# Patient Record
Sex: MALE | Race: WHITE | Employment: FULL TIME | URBAN - METROPOLITAN AREA
[De-identification: names, ages, dates, MRNs, and addresses within clinical notes are randomized per-mention and may not be internally consistent; named-entity substitution may affect disease eponyms.]

---

## 2017-02-22 ENCOUNTER — OFFICE VISIT (OUTPATIENT)
Dept: NEUROSURGERY | Facility: CLINIC | Age: 62
End: 2017-02-22

## 2017-02-22 VITALS
DIASTOLIC BLOOD PRESSURE: 98 MMHG | SYSTOLIC BLOOD PRESSURE: 152 MMHG | BODY MASS INDEX: 19.94 KG/M2 | HEIGHT: 66 IN | HEART RATE: 74 BPM | WEIGHT: 124.1 LBS

## 2017-02-22 DIAGNOSIS — R90.0 INTRACRANIAL MASS: Primary | ICD-10-CM

## 2017-02-22 RX ORDER — CHLORAL HYDRATE 500 MG
2 CAPSULE ORAL DAILY
COMMUNITY

## 2017-02-22 RX ORDER — MULTIVIT-MIN/IRON/FOLIC ACID/K 18-600-40
CAPSULE ORAL DAILY
COMMUNITY

## 2017-02-22 ASSESSMENT — PAIN SCALES - GENERAL: PAINLEVEL: MILD PAIN (3)

## 2017-02-22 NOTE — MR AVS SNAPSHOT
"              After Visit Summary   2017    Devon Braun    MRN: [id number]           Patient Information     Date Of Birth          1955        Visit Information        Provider Department      2017 10:30 AM Sunny Tesfaye MD [facility] Neurosurgery        Today's Diagnoses     Intracranial mass    -  1       Follow-ups after your visit        Who to contact     Please call your clinic at [phone] to:    Ask questions about your health    Make or cancel appointments    Discuss your medicines    Learn about your test results    Speak to your doctor   If you have compliments or concerns about an experience at your clinic, or if you wish to file a complaint, please contact [facility] Patient Relations at [phone] or email us at [email or web address]         Additional Information About Your Visit        MyChart Information     Code42 is an electronic gateway that provides easy, online access to your medical records. With Code42, you can request a clinic appointment, read your test results, renew a prescription or communicate with your care team.     To sign up for [employer] visit the website at [email or web address]   You will be asked to enter the access code listed below, as well as some personal information. Please follow the directions to create your username and password.     Your access code is: [email or web address]  Expires: 2017  6:30 AM     Your access code will  in 90 days. If you need help or a new code, please contact your [facility] or call [phone] for assistance.        Care EveryWhere ID     This is your Care EveryWhere ID. This could be used by other organizations to access your [place] medical records  [id number]        Your Vitals Were     Pulse Height BMI (Body Mass Index)             74 1.676 m (5' 6\") 20.03 kg/m2          Blood Pressure from Last 3 Encounters:   17 (!) " 152/98   04/06/16 (!) 191/99   01/27/16 (!) 167/99    Weight from Last 3 Encounters:   02/22/17 56.3 kg (124 lb 1.6 oz)   04/06/16 55.5 kg (122 lb 4.8 oz)   01/27/16 56.7 kg (125 lb)              Today, you had the following     No orders found for display       Primary Care Provider Office Phone # Fax #    [facility] [phone]       [facility]        Thank you!     Thank you for choosing [facility]  for your care. Our goal is always to provide you with excellent care. Hearing back from our patients is one way we can continue to improve our services. Please take a few minutes to complete the written survey that you may receive in the mail after your visit with us. Thank you!             Your Updated Medication List - Protect others around you: Learn how to safely use, store and throw away your medicines at www.disposemymeds.org.          This list is accurate as of: 2/22/17 11:59 PM.  Always use your most recent med list.                   Brand Name Dispense Instructions for use    fish oil-omega-3 fatty acids 1000 MG capsule      Take 2 g by mouth daily       ibuprofen 100 MG chewable tablet    ADVIL/MOTRIN     Take 100 mg by mouth every 8 hours as needed.       latanoprost 0.005 % ophthalmic solution    XALATAN     Place 1 drop into both eyes At Bedtime       pravastatin 10 MG tablet    PRAVACHOL     Take by mouth At Bedtime       PSYLLIUM PO      Take by mouth daily       timolol 0.25 % ophthalmic solution    TIMOPTIC     Place 1 drop into both eyes 2 times daily       VITAMIN B COMPLEX PO      Take by mouth daily       Vitamin D (Cholecalciferol) 1000 UNITS Tabs      Take by mouth daily

## 2017-02-22 NOTE — NURSING NOTE
Chief Complaint   Patient presents with     RECHECK     P RETURN - MRI PRIOR Intracranial Mass     Baylee Guerrero MA

## 2017-02-22 NOTE — LETTER
2017       RE: Devon Catherine  [address]   [place] [facility] [id number]     Dear Colleague,    Thank you for referring your patient, Devon Catherine, to the [facility] NEUROSURGERY at [facility]. Please see a copy of my visit note below.    Mr. 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      We have seen him a couple of times in the past and the tumor has seemed to be stable.  It is a nonresectable tumor, although if there were focal enlargement impinging on the brainstem or nerves, it would be possible to do a subtotal therapeutic removal.  He is aware of this.      He has a number of issues affecting his life and employment now, but none of them appear to be neurologic.      He had an MRI scan which to my eye looks essentially unchanged from two years ago.  I have recommended that he have another scan and come back in 2 more years.      SUBHASH CRUZ MD       D: 2017 18:33   T: 2017 09:43   MT: CLAUDY      Name:     DEVON CATHERINE   MRN:      [id number]        Account:      [id number]   :      1955           Service Date: 2017      Document: [id number]

## 2017-02-23 NOTE — PROGRESS NOTES
Mr. 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      We have seen him a couple of times in the past and the tumor has seemed to be stable.  It is a nonresectable tumor, although if there were focal enlargement impinging on the brainstem or nerves, it would be possible to do a subtotal therapeutic removal.  He is aware of this.      He has a number of issues affecting his life and employment now, but none of them appear to be neurologic.      He had an MRI scan which to my eye looks essentially unchanged from two years ago.  I have recommended that he have another scan and come back in 2 more years.         SUBHASH CRUZ MD             D: 2017 18:33   T: 2017 09:43   MT: CLAUDY      Name:     MADELEINE CATHERINE   MRN:      [id number]        Account:      [id number]   :      1955           Service Date: 2017      Document: [id number]